# Patient Record
Sex: FEMALE | Race: ASIAN | Employment: PART TIME | URBAN - METROPOLITAN AREA
[De-identification: names, ages, dates, MRNs, and addresses within clinical notes are randomized per-mention and may not be internally consistent; named-entity substitution may affect disease eponyms.]

---

## 2017-10-30 ENCOUNTER — OFFICE VISIT (OUTPATIENT)
Dept: INTERNAL MEDICINE CLINIC | Facility: CLINIC | Age: 42
End: 2017-10-30

## 2017-10-30 VITALS
TEMPERATURE: 99 F | DIASTOLIC BLOOD PRESSURE: 82 MMHG | HEART RATE: 70 BPM | WEIGHT: 135 LBS | HEIGHT: 63.5 IN | SYSTOLIC BLOOD PRESSURE: 121 MMHG | BODY MASS INDEX: 23.62 KG/M2 | RESPIRATION RATE: 18 BRPM

## 2017-10-30 DIAGNOSIS — D64.89 ANEMIA DUE TO OTHER CAUSE, NOT CLASSIFIED: ICD-10-CM

## 2017-10-30 DIAGNOSIS — E55.9 VITAMIN D DEFICIENCY: ICD-10-CM

## 2017-10-30 DIAGNOSIS — Z01.419 ENCOUNTER FOR GYNECOLOGICAL EXAMINATION: Primary | ICD-10-CM

## 2017-10-30 DIAGNOSIS — N63.0 BREAST MASS: ICD-10-CM

## 2017-10-30 DIAGNOSIS — E78.00 PURE HYPERCHOLESTEROLEMIA: ICD-10-CM

## 2017-10-30 DIAGNOSIS — N63.20 LEFT BREAST MASS: ICD-10-CM

## 2017-10-30 PROCEDURE — 99396 PREV VISIT EST AGE 40-64: CPT | Performed by: INTERNAL MEDICINE

## 2017-10-30 PROCEDURE — 99213 OFFICE O/P EST LOW 20 MIN: CPT | Performed by: INTERNAL MEDICINE

## 2017-10-30 NOTE — PROGRESS NOTES
HPI:    Patient ID: Sorin Gomez is a 36year old female.   Patient patient presents today for pap smear / breast  exam, states doing well otherwise, denies vaginal discharge  pain or   Other concerns denies  vomiting, diarrhea, constipation or abdominal carmella Abdominal: Soft. She exhibits no mass. There is no hepatosplenomegaly. There is no tenderness. Genitourinary: Vagina normal and uterus normal. There is no tenderness or lesion on the right labia. There is no tenderness or lesion on the left labia.  Uterus Anemia due to other cause, not classified  Labs       Left breast mass-new breast mass  2:00 PM left breast-nontender patient mother has history of fatty tissue / masses in breasts  Dg  mammogram bilateral breast ordered with ultrasound if needed also carolyn

## 2017-10-31 ENCOUNTER — TELEPHONE (OUTPATIENT)
Dept: INTERNAL MEDICINE CLINIC | Facility: CLINIC | Age: 42
End: 2017-10-31

## 2017-10-31 ENCOUNTER — LAB ENCOUNTER (OUTPATIENT)
Dept: LAB | Age: 42
End: 2017-10-31
Attending: INTERNAL MEDICINE
Payer: COMMERCIAL

## 2017-10-31 DIAGNOSIS — E55.9 VITAMIN D DEFICIENCY: ICD-10-CM

## 2017-10-31 DIAGNOSIS — D64.9 ANEMIA: Primary | ICD-10-CM

## 2017-10-31 DIAGNOSIS — E78.00 PURE HYPERCHOLESTEROLEMIA: ICD-10-CM

## 2017-10-31 DIAGNOSIS — E53.8 B12 DEFICIENCY: ICD-10-CM

## 2017-10-31 DIAGNOSIS — E53.8 B12 DEFICIENCY: Primary | ICD-10-CM

## 2017-10-31 DIAGNOSIS — D64.89 ANEMIA DUE TO OTHER CAUSE, NOT CLASSIFIED: ICD-10-CM

## 2017-10-31 PROCEDURE — 85025 COMPLETE CBC W/AUTO DIFF WBC: CPT

## 2017-10-31 PROCEDURE — 82746 ASSAY OF FOLIC ACID SERUM: CPT

## 2017-10-31 PROCEDURE — 82607 VITAMIN B-12: CPT

## 2017-10-31 PROCEDURE — 36415 COLL VENOUS BLD VENIPUNCTURE: CPT

## 2017-10-31 PROCEDURE — 82728 ASSAY OF FERRITIN: CPT

## 2017-10-31 PROCEDURE — 81015 MICROSCOPIC EXAM OF URINE: CPT

## 2017-10-31 PROCEDURE — 80061 LIPID PANEL: CPT

## 2017-10-31 PROCEDURE — 80053 COMPREHEN METABOLIC PANEL: CPT

## 2017-10-31 PROCEDURE — 84443 ASSAY THYROID STIM HORMONE: CPT

## 2017-10-31 PROCEDURE — 82306 VITAMIN D 25 HYDROXY: CPT

## 2017-10-31 NOTE — TELEPHONE ENCOUNTER
Pt calling and stts she is at the lab in [place] and a Vitamin b-12 lab was supposed to be done as well, yet no order in chart. Please advise.

## 2017-11-01 ENCOUNTER — NURSE NAVIGATOR ENCOUNTER (OUTPATIENT)
Dept: HEMATOLOGY/ONCOLOGY | Facility: HOSPITAL | Age: 42
End: 2017-11-01

## 2017-11-01 ENCOUNTER — HOSPITAL ENCOUNTER (OUTPATIENT)
Dept: MAMMOGRAPHY | Facility: HOSPITAL | Age: 42
Discharge: HOME | End: 2017-11-01
Attending: INTERNAL MEDICINE
Payer: COMMERCIAL

## 2017-11-01 ENCOUNTER — HOSPITAL ENCOUNTER (OUTPATIENT)
Dept: ULTRASOUND IMAGING | Facility: HOSPITAL | Age: 42
Discharge: HOME | End: 2017-11-01
Attending: INTERNAL MEDICINE
Payer: COMMERCIAL

## 2017-11-01 DIAGNOSIS — N63.20 LEFT BREAST MASS: ICD-10-CM

## 2017-11-01 DIAGNOSIS — N63.0 BREAST MASS: ICD-10-CM

## 2017-11-01 PROCEDURE — 77066 DX MAMMO INCL CAD BI: CPT | Performed by: INTERNAL MEDICINE

## 2017-11-01 PROCEDURE — 76642 ULTRASOUND BREAST LIMITED: CPT | Performed by: INTERNAL MEDICINE

## 2017-11-01 NOTE — PROGRESS NOTES
Navigator called to patient to follow-up from breast biopsy recommendation. Message left for patient to call Navigator back.

## 2017-11-02 ENCOUNTER — NURSE NAVIGATOR ENCOUNTER (OUTPATIENT)
Dept: HEMATOLOGY/ONCOLOGY | Facility: HOSPITAL | Age: 42
End: 2017-11-02

## 2017-11-02 NOTE — PROGRESS NOTES
Discussed recommended breast biopsy with patient. Reviewed pertinent patient history, family history of cancer, and patient medications.     - Discussed with patient Radiology’s protocol for being off blood thinning medications (including ibuprofen/advil

## 2017-11-15 ENCOUNTER — OFFICE VISIT (OUTPATIENT)
Dept: SURGERY | Facility: CLINIC | Age: 42
End: 2017-11-15

## 2017-11-15 VITALS
TEMPERATURE: 98 F | BODY MASS INDEX: 24.1 KG/M2 | HEIGHT: 63 IN | HEART RATE: 77 BPM | SYSTOLIC BLOOD PRESSURE: 126 MMHG | WEIGHT: 136 LBS | DIASTOLIC BLOOD PRESSURE: 71 MMHG | RESPIRATION RATE: 20 BRPM

## 2017-11-15 DIAGNOSIS — N63.20 LEFT BREAST MASS: Primary | ICD-10-CM

## 2017-11-15 PROCEDURE — 99244 OFF/OP CNSLTJ NEW/EST MOD 40: CPT | Performed by: SURGERY

## 2017-11-15 NOTE — PROGRESS NOTES
Pt had abnormal mammogram.  She did feel a lump when showering a few months ago, but didn't notice it, it was before her period. Her PCP felt a lump. Denies nipple discharge or breast pain.

## 2017-11-15 NOTE — PROGRESS NOTES
Breast Surgery New Patient Consultation    This is the first visit for this 36year old woman, referred by Dr. Romy Friend, who presents for evaluation of left breast mass.     History of Present Illness:   Ms. Luci Henson is a 36year old woman who present breathing, Phenergan    Family History:   Family History   Problem Relation Age of Onset   • Diabetes Father    • Cancer Mother      Multiple myeloma CA   • Hypertension Mother    • Cancer Maternal Aunt      Cancer-colon       She is not of Fide Duvall vaginal/penile discharge. Skin:    The patient denies rash, itching, skin lesions, +dry skin, change in skin color or change in moles.      Hematologic/Lymphatic:  The patient denies easily bruising or bleeding or persistent swollen glands or lymph nodes normal. There is no skin dimpling with movement of the pectoralis. There is no nipple retraction. No nipple discharge can be elicited. The parenchyma is mildly nodular.  There are no dominant masses in the breast. The axillary tail is normal.  Left breast: order to monitor for interval growth, I would recommend a surveillance exam in 3-4 months within ultrasound of the area in the office that day.   We did discuss the option of a needle biopsy for confirming the pathology diagnosis, however the patient is anx

## 2018-01-31 ENCOUNTER — TELEPHONE (OUTPATIENT)
Dept: INTERNAL MEDICINE CLINIC | Facility: CLINIC | Age: 43
End: 2018-01-31

## 2018-01-31 NOTE — TELEPHONE ENCOUNTER
Pt calling to discuss billing issue. Pt states that she was seen for established-physical exam in October of 2017, but she was also being billed for an office visit. She has spoken with billing and they confirm the coding was correct, but she does not remember ever being charged previously for this. Pt does not believe she should be charged due to it being a well visit. Pt requesting a call back to discuss.

## 2018-02-14 NOTE — TELEPHONE ENCOUNTER
CODING IS  CORRECT  PT  HAD     OTHER    DIAGNOSIS  -  HER MEDICAL  CONDITIONS   - THAT IS NOT   PART JUST  OF  PHYSICAL  / OR PAP ITSELF    -  BREAST   MASS -  THAT IS  NOT PREVENTATIVE  ALSO HYPERLIPIDEMIA  - IS NOT  PREVENTATIVE  . Levi Reveal      PLEASE   EXPLAIN  THIS TO PATIENT      THANKS

## 2018-02-14 NOTE — TELEPHONE ENCOUNTER
Please advise to the communication below. Should this be sent to billing? Thank you. Yoseph Flanagan Please respond to pool: EM IM LMB LPN/CMA

## 2018-04-18 ENCOUNTER — HOSPITAL ENCOUNTER (OUTPATIENT)
Facility: HOSPITAL | Age: 43
Discharge: HOME | End: 2018-04-18
Attending: SURGERY
Payer: COMMERCIAL

## 2018-04-18 ENCOUNTER — OFFICE VISIT (OUTPATIENT)
Dept: SURGERY | Facility: CLINIC | Age: 43
End: 2018-04-18

## 2018-04-18 VITALS
OXYGEN SATURATION: 100 % | SYSTOLIC BLOOD PRESSURE: 114 MMHG | RESPIRATION RATE: 20 BRPM | BODY MASS INDEX: 23.22 KG/M2 | WEIGHT: 136 LBS | HEART RATE: 75 BPM | HEIGHT: 64 IN | DIASTOLIC BLOOD PRESSURE: 72 MMHG

## 2018-04-18 DIAGNOSIS — N63.20 LEFT BREAST MASS: Primary | ICD-10-CM

## 2018-04-18 PROCEDURE — 76642 ULTRASOUND BREAST LIMITED: CPT | Performed by: SURGERY

## 2018-04-18 PROCEDURE — 99211 OFF/OP EST MAY X REQ PHY/QHP: CPT | Performed by: SURGERY

## 2018-04-18 PROCEDURE — 99214 OFFICE O/P EST MOD 30 MIN: CPT | Performed by: SURGERY

## 2018-04-18 RX ORDER — CHLORAL HYDRATE 500 MG
1 CAPSULE ORAL
COMMUNITY

## 2018-04-19 NOTE — PROGRESS NOTES
Breast Surgery Surveillance    History of Present Illness:   Ms. Loyda Cabrera is a 39year old woman who presents with a self detected mass in the left breast and was first seen in November 2017. She has no family history of breast cancer.  She denies any n Cancer Maternal Aunt      Cancer-colon       She is not of Ashkenazi Rastafari ancestry. Social History:    Alcohol use No         Smoking status: Never Smoker   Smokeless tobacco: Never Used   The patient is . She has 3 children.   She is employed denies easily bruising or bleeding or persistent swollen glands or lymph nodes. Musculoskeletal:  The patient denies muscle aches/pain, joint pain, stiff joints, neck pain, back pain or bone pain.     Neuropsychiatric:  There is no history of migraines The axillary tail is normal.  Left breast:   The skin, nipple, and areola appear normal. There is no skin dimpling with movement of the pectoralis. There is no nipple retraction. No nipple discharge can be elicited. The parenchyma is mildly nodular.  There lesion is asymptomatic, observation is a reasonable approach to management at this time. In order to monitor for interval growth, I would recommend a surveillance exam at the time of her annual mammogram in November 2018.   We did discuss the option of a ne

## 2018-10-16 ENCOUNTER — IMMUNIZATION (OUTPATIENT)
Dept: INTERNAL MEDICINE CLINIC | Facility: CLINIC | Age: 43
End: 2018-10-16

## 2018-10-16 DIAGNOSIS — Z23 NEED FOR VACCINATION: ICD-10-CM

## 2018-10-16 PROCEDURE — 90471 IMMUNIZATION ADMIN: CPT | Performed by: INTERNAL MEDICINE

## 2018-10-16 PROCEDURE — 90686 IIV4 VACC NO PRSV 0.5 ML IM: CPT | Performed by: INTERNAL MEDICINE

## 2019-05-10 ENCOUNTER — TELEPHONE (OUTPATIENT)
Dept: INTERNAL MEDICINE CLINIC | Facility: CLINIC | Age: 44
End: 2019-05-10

## 2019-05-10 DIAGNOSIS — D64.89 ANEMIA DUE TO OTHER CAUSE, NOT CLASSIFIED: ICD-10-CM

## 2019-05-10 DIAGNOSIS — Z00.00 ANNUAL PHYSICAL EXAM: Primary | ICD-10-CM

## 2019-05-10 DIAGNOSIS — E55.9 VITAMIN D DEFICIENCY: ICD-10-CM

## 2019-05-13 NOTE — TELEPHONE ENCOUNTER
Pt is requesting an order for labs before upcoming appt. Orders generated and pend for approval. Thank you. Nayana Mills .Please respond to pool: EMILY HANNAH LPN/ELISA

## 2019-05-14 NOTE — TELEPHONE ENCOUNTER
Pt was contacted and informed that the order for the labs are in the system and that she will need to be fasting for 12 hours. Understanding was voiced.

## 2019-09-12 ENCOUNTER — TELEPHONE (OUTPATIENT)
Dept: INTERNAL MEDICINE CLINIC | Facility: CLINIC | Age: 44
End: 2019-09-12

## 2019-09-12 DIAGNOSIS — Z28.3 BEHIND ON IMMUNIZATIONS: Primary | ICD-10-CM

## 2019-09-12 DIAGNOSIS — Z11.1 SCREENING-PULMONARY TB: ICD-10-CM

## 2019-09-12 NOTE — TELEPHONE ENCOUNTER
Pt states that she already has orders in the system that she will be doing, but, pt would like to know if the doctor can also order titers for HEP B , Measle, Mumps Rubella Varicella and quantifor TB gold blood test. Please, call pt when those orders are e

## 2019-09-16 NOTE — TELEPHONE ENCOUNTER
Patient is requesting titers for HEP B , Measle, Mumps Rubella Varicella and quantifor TB gold blood test. Please advise.

## 2019-09-17 ENCOUNTER — OFFICE VISIT (OUTPATIENT)
Dept: OCCUPATIONAL MEDICINE | Age: 44
End: 2019-09-17
Attending: FAMILY MEDICINE
Payer: COMMERCIAL

## 2019-09-17 ENCOUNTER — TELEPHONE (OUTPATIENT)
Dept: INTERNAL MEDICINE CLINIC | Facility: CLINIC | Age: 44
End: 2019-09-17

## 2019-09-17 ENCOUNTER — TELEPHONE (OUTPATIENT)
Dept: FAMILY MEDICINE CLINIC | Facility: CLINIC | Age: 44
End: 2019-09-17

## 2019-09-17 ENCOUNTER — LAB ENCOUNTER (OUTPATIENT)
Dept: LAB | Age: 44
End: 2019-09-17
Attending: INTERNAL MEDICINE
Payer: COMMERCIAL

## 2019-09-17 DIAGNOSIS — E55.9 VITAMIN D DEFICIENCY: ICD-10-CM

## 2019-09-17 DIAGNOSIS — Z00.00 ROUTINE GENERAL MEDICAL EXAMINATION AT A HEALTH CARE FACILITY: Primary | ICD-10-CM

## 2019-09-17 DIAGNOSIS — Z11.1 SCREENING-PULMONARY TB: ICD-10-CM

## 2019-09-17 DIAGNOSIS — Z28.3 BEHIND ON IMMUNIZATIONS: ICD-10-CM

## 2019-09-17 DIAGNOSIS — Z28.39 BEHIND ON IMMUNIZATIONS: Primary | ICD-10-CM

## 2019-09-17 DIAGNOSIS — Z00.00 ANNUAL PHYSICAL EXAM: ICD-10-CM

## 2019-09-17 DIAGNOSIS — D64.89 ANEMIA DUE TO OTHER CAUSE, NOT CLASSIFIED: ICD-10-CM

## 2019-09-17 LAB
ALBUMIN SERPL-MCNC: 4 G/DL (ref 3.4–5)
ALBUMIN/GLOB SERPL: 1.1 {RATIO} (ref 1–2)
ALP LIVER SERPL-CCNC: 58 U/L (ref 37–98)
ALT SERPL-CCNC: 26 U/L (ref 13–56)
ANION GAP SERPL CALC-SCNC: 8 MMOL/L (ref 0–18)
AST SERPL-CCNC: 15 U/L (ref 15–37)
BASOPHILS # BLD AUTO: 0.06 X10(3) UL (ref 0–0.2)
BASOPHILS NFR BLD AUTO: 0.9 %
BILIRUB SERPL-MCNC: 0.6 MG/DL (ref 0.1–2)
BILIRUB UR QL: NEGATIVE
BUN BLD-MCNC: 10 MG/DL (ref 7–18)
BUN/CREAT SERPL: 17.5 (ref 10–20)
CALCIUM BLD-MCNC: 9.2 MG/DL (ref 8.5–10.1)
CHLORIDE SERPL-SCNC: 106 MMOL/L (ref 98–112)
CHOLEST SMN-MCNC: 219 MG/DL (ref ?–200)
CLARITY UR: CLEAR
CO2 SERPL-SCNC: 25 MMOL/L (ref 21–32)
COLOR UR: YELLOW
CREAT BLD-MCNC: 0.57 MG/DL (ref 0.55–1.02)
DEPRECATED HBV CORE AB SER IA-ACNC: 20.1 NG/ML (ref 12–240)
DEPRECATED RDW RBC AUTO: 39.7 FL (ref 35.1–46.3)
EOSINOPHIL # BLD AUTO: 0.28 X10(3) UL (ref 0–0.7)
EOSINOPHIL NFR BLD AUTO: 4.1 %
ERYTHROCYTE [DISTWIDTH] IN BLOOD BY AUTOMATED COUNT: 12.1 % (ref 11–15)
FOLATE SERPL-MCNC: >20 NG/ML (ref 8.7–?)
GLOBULIN PLAS-MCNC: 3.8 G/DL (ref 2.8–4.4)
GLUCOSE BLD-MCNC: 90 MG/DL (ref 70–99)
GLUCOSE UR-MCNC: NEGATIVE MG/DL
HBV SURFACE AB SER QL: REACTIVE
HBV SURFACE AB SERPL IA-ACNC: >1000 MIU/ML
HCT VFR BLD AUTO: 38.3 % (ref 35–48)
HDLC SERPL-MCNC: 38 MG/DL (ref 40–59)
HGB BLD-MCNC: 12.6 G/DL (ref 12–16)
IMM GRANULOCYTES # BLD AUTO: 0.01 X10(3) UL (ref 0–1)
IMM GRANULOCYTES NFR BLD: 0.1 %
KETONES UR-MCNC: NEGATIVE MG/DL
LDLC SERPL CALC-MCNC: 129 MG/DL (ref ?–100)
LEUKOCYTE ESTERASE UR QL STRIP.AUTO: NEGATIVE
LYMPHOCYTES # BLD AUTO: 2.46 X10(3) UL (ref 1–4)
LYMPHOCYTES NFR BLD AUTO: 35.9 %
M PROTEIN MFR SERPL ELPH: 7.8 G/DL (ref 6.4–8.2)
MCH RBC QN AUTO: 29.5 PG (ref 26–34)
MCHC RBC AUTO-ENTMCNC: 32.9 G/DL (ref 31–37)
MCV RBC AUTO: 89.7 FL (ref 80–100)
MONOCYTES # BLD AUTO: 0.37 X10(3) UL (ref 0.1–1)
MONOCYTES NFR BLD AUTO: 5.4 %
NEUTROPHILS # BLD AUTO: 3.67 X10 (3) UL (ref 1.5–7.7)
NEUTROPHILS # BLD AUTO: 3.67 X10(3) UL (ref 1.5–7.7)
NEUTROPHILS NFR BLD AUTO: 53.6 %
NITRITE UR QL STRIP.AUTO: NEGATIVE
NONHDLC SERPL-MCNC: 181 MG/DL (ref ?–130)
OSMOLALITY SERPL CALC.SUM OF ELEC: 287 MOSM/KG (ref 275–295)
PATIENT FASTING: YES
PATIENT FASTING: YES
PH UR: 7 [PH] (ref 5–8)
PLATELET # BLD AUTO: 234 10(3)UL (ref 150–450)
POTASSIUM SERPL-SCNC: 4 MMOL/L (ref 3.5–5.1)
PROT UR-MCNC: NEGATIVE MG/DL
RBC # BLD AUTO: 4.27 X10(6)UL (ref 3.8–5.3)
RBC #/AREA URNS AUTO: <1 /HPF
RUBV IGG SER QL: POSITIVE
RUBV IGG SER-ACNC: 88.4 IU/ML (ref 10–?)
SODIUM SERPL-SCNC: 139 MMOL/L (ref 136–145)
SP GR UR STRIP: 1.01 (ref 1–1.03)
TRIGL SERPL-MCNC: 259 MG/DL (ref 30–149)
TSI SER-ACNC: 0.96 MIU/ML (ref 0.36–3.74)
UROBILINOGEN UR STRIP-ACNC: <2
VIT B12 SERPL-MCNC: 523 PG/ML (ref 193–986)
VLDLC SERPL CALC-MCNC: 52 MG/DL (ref 0–30)
WBC # BLD AUTO: 6.9 X10(3) UL (ref 4–11)
WBC #/AREA URNS AUTO: <1 /HPF

## 2019-09-17 PROCEDURE — 86762 RUBELLA ANTIBODY: CPT

## 2019-09-17 PROCEDURE — 82746 ASSAY OF FOLIC ACID SERUM: CPT

## 2019-09-17 PROCEDURE — 81001 URINALYSIS AUTO W/SCOPE: CPT

## 2019-09-17 PROCEDURE — 86787 VARICELLA-ZOSTER ANTIBODY: CPT

## 2019-09-17 PROCEDURE — 85025 COMPLETE CBC W/AUTO DIFF WBC: CPT

## 2019-09-17 PROCEDURE — 86765 RUBEOLA ANTIBODY: CPT

## 2019-09-17 PROCEDURE — 82306 VITAMIN D 25 HYDROXY: CPT

## 2019-09-17 PROCEDURE — 82728 ASSAY OF FERRITIN: CPT

## 2019-09-17 PROCEDURE — 80061 LIPID PANEL: CPT

## 2019-09-17 PROCEDURE — 86706 HEP B SURFACE ANTIBODY: CPT

## 2019-09-17 PROCEDURE — 84443 ASSAY THYROID STIM HORMONE: CPT

## 2019-09-17 PROCEDURE — 86735 MUMPS ANTIBODY: CPT

## 2019-09-17 PROCEDURE — 86480 TB TEST CELL IMMUN MEASURE: CPT

## 2019-09-17 PROCEDURE — 80053 COMPREHEN METABOLIC PANEL: CPT

## 2019-09-17 PROCEDURE — 82607 VITAMIN B-12: CPT

## 2019-09-17 PROCEDURE — 36415 COLL VENOUS BLD VENIPUNCTURE: CPT

## 2019-09-17 NOTE — TELEPHONE ENCOUNTER
Pt states that she is in the lab in [address] [place] [address] and would like to know if the doctor can order the varicella titer for her. Per pt she already did the rest of the blood work.

## 2019-09-17 NOTE — TELEPHONE ENCOUNTER
Order pend for varicella titer for approval. Thank you       . Anne Bay Please respond to pool: EM IM LMB LPN/CMA

## 2019-09-18 LAB
25(OH)D3 SERPL-MCNC: 14.7 NG/ML (ref 30–100)
M TB IFN-G CD4+ T-CELLS BLD-ACNC: 0.04 IU/ML
M TB TUBERC IFN-G BLD QL: NEGATIVE
M TB TUBERC IGNF/MITOGEN IGNF CONTROL: >10 IU/ML
MEV IGG SER-ACNC: >300 AU/ML (ref 30–?)
MUV IGG SER IA-ACNC: 152 AU/ML (ref 11–?)
QUANTIFERON TB1 MINUS NIL: 0.2 IU/ML
QUANTIFERON TB2 MINUS NIL: 0.1 IU/ML
VZV IGG SER IA-ACNC: 1155 (ref 165–?)

## 2019-09-23 ENCOUNTER — OFFICE VISIT (OUTPATIENT)
Dept: INTERNAL MEDICINE CLINIC | Facility: CLINIC | Age: 44
End: 2019-09-23

## 2019-09-23 VITALS
HEIGHT: 64 IN | WEIGHT: 138 LBS | TEMPERATURE: 98 F | DIASTOLIC BLOOD PRESSURE: 82 MMHG | SYSTOLIC BLOOD PRESSURE: 120 MMHG | HEART RATE: 69 BPM | BODY MASS INDEX: 23.56 KG/M2 | RESPIRATION RATE: 20 BRPM

## 2019-09-23 DIAGNOSIS — E78.00 PURE HYPERCHOLESTEROLEMIA: ICD-10-CM

## 2019-09-23 DIAGNOSIS — Z23 NEED FOR INFLUENZA VACCINATION: ICD-10-CM

## 2019-09-23 DIAGNOSIS — Z00.00 PE (PHYSICAL EXAM), ROUTINE: Primary | ICD-10-CM

## 2019-09-23 DIAGNOSIS — R79.0 LOW FERRITIN: ICD-10-CM

## 2019-09-23 DIAGNOSIS — E55.9 VITAMIN D DEFICIENCY: ICD-10-CM

## 2019-09-23 DIAGNOSIS — R79.0 DECREASED FERRITIN: ICD-10-CM

## 2019-09-23 PROCEDURE — 90686 IIV4 VACC NO PRSV 0.5 ML IM: CPT | Performed by: INTERNAL MEDICINE

## 2019-09-23 PROCEDURE — 90471 IMMUNIZATION ADMIN: CPT | Performed by: INTERNAL MEDICINE

## 2019-09-23 PROCEDURE — 99396 PREV VISIT EST AGE 40-64: CPT | Performed by: INTERNAL MEDICINE

## 2019-09-23 RX ORDER — ACETAMINOPHEN 160 MG
2000 TABLET,DISINTEGRATING ORAL DAILY
Qty: 90 CAPSULE | Refills: 3 | Status: SHIPPED | OUTPATIENT
Start: 2019-09-23

## 2019-09-23 NOTE — PROGRESS NOTES
HPI:    Patient ID: Sebastien Samson is a 38year old female. No chief complaint on file.     Patient presents today for physical  Exam states doing well otherwise, denies chest pain, shortness of breath, dyspnea on exertion or heart palpitations also denies na well-nourished. No distress. HENT:   Head: Normocephalic and atraumatic.    Right Ear: Tympanic membrane, external ear and ear canal normal.   Left Ear: Tympanic membrane, external ear and ear canal normal.   Nose: Nose normal. Right sinus exhibits no max reviewed mammogram in the system patient will complete that as well.   Immunizations reviewed -provided patient with immunization records and updated today with a flu shot  Patient verbalized understanding and compliance     Need for influenza vaccination

## 2020-02-12 ENCOUNTER — TELEPHONE (OUTPATIENT)
Dept: OTHER | Age: 45
End: 2020-02-12

## 2020-02-12 DIAGNOSIS — H02.89 PAIN AND SWELLING OF EYELID OF LEFT EYE: Primary | ICD-10-CM

## 2020-02-12 DIAGNOSIS — H02.846 PAIN AND SWELLING OF EYELID OF LEFT EYE: Primary | ICD-10-CM

## 2020-02-12 NOTE — TELEPHONE ENCOUNTER
Patient states has sty of left eye since 2/8/20 Left upper lid. Swollen, discomfort, tearing, has headache left side and swollen lymph node left neck. No fever. Has been using warm compresses to eyelid and not improving. Offered appointment today.   Woul

## 2020-02-12 NOTE — TELEPHONE ENCOUNTER
Per QUITA left message on voice mail referral placed phone number for Dr. Trudy Long given to patient.

## 2020-02-13 ENCOUNTER — OFFICE VISIT (OUTPATIENT)
Dept: OPHTHALMOLOGY | Facility: CLINIC | Age: 45
End: 2020-02-13

## 2020-02-13 DIAGNOSIS — B02.33 HZV (HERPES ZOSTER VIRUS) KERATOCONJUNCTIVITIS: Primary | ICD-10-CM

## 2020-02-13 PROBLEM — H57.89 EYE IRRITATION: Status: ACTIVE | Noted: 2020-02-13

## 2020-02-13 PROCEDURE — 99243 OFF/OP CNSLTJ NEW/EST LOW 30: CPT | Performed by: OPHTHALMOLOGY

## 2020-02-13 RX ORDER — VALACYCLOVIR HYDROCHLORIDE 1 G/1
1 TABLET, FILM COATED ORAL 3 TIMES DAILY
Qty: 21 TABLET | Refills: 0 | Status: SHIPPED | OUTPATIENT
Start: 2020-02-13 | End: 2020-02-20

## 2020-02-13 RX ORDER — PREDNISOLONE ACETATE 10 MG/ML
1 SUSPENSION/ DROPS OPHTHALMIC 4 TIMES DAILY
Qty: 1 BOTTLE | Refills: 0 | Status: SHIPPED | OUTPATIENT
Start: 2020-02-13 | End: 2020-02-20

## 2020-02-13 NOTE — PROGRESS NOTES
Magi Chan is a 38year old female. HPI:     HPI     Consult      Additional comments: Per Dr Sonali Espinoza               Comments     Patient is here for evaluation of swelling, irritation and tearing OS since 2/8/2020.  Patient states had a small bump on OSCAR 2 Multiple Vitamin (MULTI-VITAMINS) Oral Tab Take by mouth daily.            Allergies:    Promethazine                Comment:Other reaction(s): problems breathing, Phenergan    ROS:     ROS     Positive for: Eyes    Negative for: Constitutional, Gastrointes prednisoLONE acetate (PRED FORTE) 1 % Ophthalmic Suspension 1 Bottle 0     Sig: Place 1 drop into the left eye 4 (four) times daily.    • valACYclovir HCl 1 G Oral Tab 21 tablet 0     Sig: Take 1 tablet (1,000 mg total) by mouth 3 (three) times daily for 7

## 2020-02-13 NOTE — PATIENT INSTRUCTIONS
HZV (herpes zoster virus) keratoconjunctivitis  Discussed that this looks like a herpes zoster infection. Start Pred forte drops; use 1 drop 4 times a day in the left eye until next visit next week.      s.Start Valtrex (1 gram) 3 times a day for 7 days · After a few days, the blisters become dry and form a crust. The crust falls off in days to weeks. The blisters generally do not leave scars. How is shingles treated? For most people, shingles heals on its own in a few weeks.  But treatment is recommende You can only get shingles if you have had chickenpox in the past. Those who have never had chickenpox can get the virus from you. Although instead of developing shingles, the person may get chickenpox.  Until your blisters form scabs, avoid contact with oth After the blisters heal, the affected skin may be sensitive or painful for weeks or months, gradually resolving over time. But, sometimes this can last longer and be permanent (called postherpetic neuralgia.)  Shingles vaccines are available.  Vaccination c © 1222-7546 [employer] [address] [facility] [facility] [place] [place]. All rights reserved. This information is not intended as a substitute for professional medical care. Always follow your healthcare professional's instructions.

## 2020-02-13 NOTE — ASSESSMENT & PLAN NOTE
Discussed that this looks like a herpes zoster infection. Start Pred forte drops; use 1 drop 4 times a day in the left eye until next visit next week. s.Start Valtrex (1 gram) 3 times a day for 7 days, then discontinue.       Patient should call offic

## 2020-02-14 ENCOUNTER — TELEPHONE (OUTPATIENT)
Dept: OPHTHALMOLOGY | Facility: CLINIC | Age: 45
End: 2020-02-14

## 2020-02-14 NOTE — TELEPHONE ENCOUNTER
Pt will go ahead and use the drops but she is very concerned about a \"contraindication\" listed on the medication.  She will keep her apt as scheduled on 2/19/20

## 2020-02-14 NOTE — TELEPHONE ENCOUNTER
Routed to [facility]. Informed patient he will not be back in the office until Tuesday.

## 2020-02-14 NOTE — TELEPHONE ENCOUNTER
Pt calling back states medication given should not be use with her eye condition per the direction on medication .  Pt states not comfortable using the medication and wants to know can something else be prescribed please advise       prednisoLONE acetate (P

## 2020-02-17 NOTE — TELEPHONE ENCOUNTER
I followed up with pt this morning and she states that the swelling got worse on her face. She is using the Pred Forte drops 4x a day as directed. She was seen at urgent care on [facility]; Dr. Amber Rodriguez was on call and saw the patient.  He Rxd pt oral Prednisone

## 2020-02-19 ENCOUNTER — TELEPHONE (OUTPATIENT)
Dept: OPHTHALMOLOGY | Facility: CLINIC | Age: 45
End: 2020-02-19

## 2020-02-19 ENCOUNTER — MED REC SCAN ONLY (OUTPATIENT)
Dept: INTERNAL MEDICINE CLINIC | Facility: CLINIC | Age: 45
End: 2020-02-19

## 2020-02-19 DIAGNOSIS — B02.33 HZV (HERPES ZOSTER VIRUS) KERATOCONJUNCTIVITIS: Primary | ICD-10-CM

## 2020-02-19 NOTE — TELEPHONE ENCOUNTER
Pt has scheduled f/u visit with Dr. Martin Cullen due to infection of eye. Referral generated and pend for approval. Pt has appt on 02/20/2020. Thank you. Justine Zhang .Please respond to pool: EM IM LMB LPN/CMA

## 2020-02-20 ENCOUNTER — OFFICE VISIT (OUTPATIENT)
Dept: OPHTHALMOLOGY | Facility: CLINIC | Age: 45
End: 2020-02-20

## 2020-02-20 DIAGNOSIS — B02.32 HZV (HERPES ZOSTER VIRUS) IRITIS: Primary | ICD-10-CM

## 2020-02-20 PROCEDURE — 99213 OFFICE O/P EST LOW 20 MIN: CPT | Performed by: OPHTHALMOLOGY

## 2020-02-20 RX ORDER — PREDNISOLONE ACETATE 10 MG/ML
1 SUSPENSION/ DROPS OPHTHALMIC 4 TIMES DAILY
Qty: 1 BOTTLE | Refills: 0 | Status: SHIPPED | OUTPATIENT
Start: 2020-02-20 | End: 2020-02-27

## 2020-02-20 NOTE — ASSESSMENT & PLAN NOTE
Continue Pred forte 4 times a day in the left eye until next visit in 2 weeks. Patient should call office and make sooner return appointment if symptoms worsen.

## 2020-02-20 NOTE — PATIENT INSTRUCTIONS
HZV (herpes zoster virus) iritis  Continue Pred forte 4 times a day in the left eye until next visit in 2 weeks. Patient should call office and make sooner return appointment if symptoms worsen.

## 2020-02-20 NOTE — TELEPHONE ENCOUNTER
Managed care please notify patient once chart has been updated with approval, thank you.     Referral # Creation Date Referral Status Status Update    [id number] 02/19/2020 Open 02/19/2020: Status History   Status Reason Referral Type Referral Reasons Referra

## 2020-02-25 ENCOUNTER — TELEPHONE (OUTPATIENT)
Dept: OPHTHALMOLOGY | Facility: CLINIC | Age: 45
End: 2020-02-25

## 2020-02-25 NOTE — TELEPHONE ENCOUNTER
Moved apt up sooner to this Thursday and told pt to continue taking the drops 4x a day as directed at her last apt.

## 2020-02-27 ENCOUNTER — OFFICE VISIT (OUTPATIENT)
Dept: OPHTHALMOLOGY | Facility: CLINIC | Age: 45
End: 2020-02-27

## 2020-02-27 DIAGNOSIS — B02.32 HZV (HERPES ZOSTER VIRUS) IRITIS: Primary | ICD-10-CM

## 2020-02-27 PROCEDURE — 99213 OFFICE O/P EST LOW 20 MIN: CPT | Performed by: OPHTHALMOLOGY

## 2020-02-27 RX ORDER — PREDNISOLONE ACETATE 10 MG/ML
SUSPENSION/ DROPS OPHTHALMIC
Qty: 1 BOTTLE | Refills: 0 | Status: SHIPPED | OUTPATIENT
Start: 2020-02-27 | End: 2020-03-19

## 2020-02-27 NOTE — PROGRESS NOTES
Hattie Booker is a 38year old female. HPI:     HPI     Patient is here for a recheck of herpes zoster iritis OS. She is taking Pred Forte OS QID.   She complains of occasional  Pain (4/10)  behind her left eye when she moves it, itching in the temporal c ([facility], 10:44 AM)       Right Left    Pressure  15          Pupils       Pupils    Right PERRL    Left PERRL            Slit Lamp and Fundus Exam     External Exam       Right Left    External  Scarring on left forehead, no nodes          Slit Lamp Exam

## 2020-02-27 NOTE — ASSESSMENT & PLAN NOTE
Improved. Begin to wean off of Pred forte in the left eye. Use 1 drop 3 times a day for 7 day, then 2 times a day for 7 days, then once a day for 7 days, then discontinue.   Patient should call office and make a sooner return appointment if symptoms worse

## 2020-03-18 ENCOUNTER — TELEPHONE (OUTPATIENT)
Dept: OPHTHALMOLOGY | Facility: CLINIC | Age: 45
End: 2020-03-18

## 2020-03-18 NOTE — TELEPHONE ENCOUNTER
Called patient to make sure that she was able to get in at [facility] today.  Pt states she she was seen and was told she has episcleritis OS, was told to increase her Pred Forte drops OD TID for a week and that they would send our office notes fr

## 2020-03-18 NOTE — TELEPHONE ENCOUNTER
Spoke with patient and she said yesterday she started to notice some redness on upper part of white part of eye OS, today she feels like the redness is starting to come to the inner corner of white of the eye. Pt feels some tenderness OS.  She is still taki

## 2020-04-02 ENCOUNTER — OFFICE VISIT (OUTPATIENT)
Dept: OPHTHALMOLOGY | Facility: CLINIC | Age: 45
End: 2020-04-02

## 2020-04-02 DIAGNOSIS — H15.102 EPISCLERITIS OF LEFT EYE: ICD-10-CM

## 2020-04-02 DIAGNOSIS — B02.32 HZV (HERPES ZOSTER VIRUS) IRITIS: Primary | ICD-10-CM

## 2020-04-02 PROCEDURE — 99213 OFFICE O/P EST LOW 20 MIN: CPT | Performed by: OPHTHALMOLOGY

## 2020-04-02 RX ORDER — PREDNISOLONE ACETATE 10 MG/ML
1 SUSPENSION/ DROPS OPHTHALMIC
COMMUNITY
Start: 2020-03-20 | End: 2020-05-07 | Stop reason: ALTCHOICE

## 2020-04-02 NOTE — PATIENT INSTRUCTIONS
HZV (herpes zoster virus) iritis  Advised patient to start tapering Pred Forte left eye. Pred Forte left eye 2 times a day for 2 weeks, then once a day for 2 weeks.       Episcleritis of left eye  Taper Pred Forte left eye

## 2020-04-02 NOTE — PROGRESS NOTES
Magi hCan is a 54year old female. HPI:     HPI     Pt is here for a recheck of HZV iritis OS and nodular episcleritis. Pt is taking Pred Forte OS TID.  Pt was last seen by Dr. Radha Vargas on 3/18/2020, was given Piroxicam 30 mg PO QD (pt not taking, told he PERRL    Left PERRL            Slit Lamp and Fundus Exam     External Exam       Right Left    External  Scarring on left forehead, no nodes          Slit Lamp Exam       Right Left    Lids/Lashes Dermatochalasis, Meibomian gland dysfunction Dermatochalasi

## 2020-04-02 NOTE — ASSESSMENT & PLAN NOTE
Advised patient to start tapering Pred Forte left eye. Pred Forte left eye 2 times a day for 2 weeks, then once a day for 2 weeks.

## 2020-04-06 ENCOUNTER — TELEPHONE (OUTPATIENT)
Dept: OPHTHALMOLOGY | Facility: CLINIC | Age: 45
End: 2020-04-06

## 2020-04-06 NOTE — TELEPHONE ENCOUNTER
Pt called stating pt is using prednisolone eye drops for 4 weeks. 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 could pt get a letter.   Please call pt

## 2020-04-07 NOTE — TELEPHONE ENCOUNTER
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 Pt is nervous that when on Prednisolone she is at risk of infecting her eyes and thinks she will be at risk with patients in isolation. Pt would like a letter stating that while she is still on Prednisolone drop that she should not be seeing any patients in isolation. Told patient I would call her back with and problems getting letter and will check with Dr regarding having letter sent.

## 2020-04-07 NOTE — TELEPHONE ENCOUNTER
Spoke with patient and let her know per ARACELIS she does not need a note for work she is not immunocompromised using Pred Forte BID for 1 week and then once a day for 2 weeks. She should keep her follow up appt in 3 weeks.

## 2020-05-01 ENCOUNTER — TELEPHONE (OUTPATIENT)
Dept: OPHTHALMOLOGY | Facility: CLINIC | Age: 45
End: 2020-05-01

## 2020-05-01 NOTE — TELEPHONE ENCOUNTER
Pt returned the call. Pt received a message to rescheduled appointment to 57-20 at 9:00am.  Pt accepted new appointment.

## 2020-05-07 ENCOUNTER — OFFICE VISIT (OUTPATIENT)
Dept: OPHTHALMOLOGY | Facility: CLINIC | Age: 45
End: 2020-05-07

## 2020-05-07 DIAGNOSIS — B02.32 HZV (HERPES ZOSTER VIRUS) IRITIS: ICD-10-CM

## 2020-05-07 DIAGNOSIS — H15.102 EPISCLERITIS OF LEFT EYE: Primary | ICD-10-CM

## 2020-05-07 PROCEDURE — 99213 OFFICE O/P EST LOW 20 MIN: CPT | Performed by: OPHTHALMOLOGY

## 2020-05-07 NOTE — PROGRESS NOTES
Amanda Burns is a 54year old female. HPI:     HPI     Pt is here for recheck episcleritis OS. Pt has been off Pred Forte drops for 1 week, last used 4/23/2020.  Pt states OS is much better, she does still see some pink on upper inner corner of white part Right Left    External  Scarring on left forehead, no nodes          Slit Lamp Exam       Right Left    Lids/Lashes Dermatochalasis, Meibomian gland dysfunction Dermatochalasis, Meibomian gland dysfunction, healing vescile LLL by nose    Conjunctiva/Sclera

## 2020-05-07 NOTE — PATIENT INSTRUCTIONS
HZV (herpes zoster virus) iritis  Stop Pred Forte, will continue to watch for redness, blurred vision or pain. Patient will let us know if symptoms start. Episcleritis of left eye  Same as above.

## 2020-05-07 NOTE — ASSESSMENT & PLAN NOTE
Stop Pred Forte, will continue to watch for redness, blurred vision or pain. Patient will let us know if symptoms start.

## 2020-06-09 ENCOUNTER — OFFICE VISIT (OUTPATIENT)
Dept: OPHTHALMOLOGY | Facility: CLINIC | Age: 45
End: 2020-06-09

## 2020-06-09 DIAGNOSIS — H17.9 CORNEAL SCAR, LEFT EYE: Primary | ICD-10-CM

## 2020-06-09 PROCEDURE — 99213 OFFICE O/P EST LOW 20 MIN: CPT | Performed by: OPHTHALMOLOGY

## 2020-06-09 NOTE — PATIENT INSTRUCTIONS
Corneal scar, left eye  Discussed diagnosis with patient. She does not want to use steroid drops. Will probably resolve without drops. Will have patient back in 2-3 months to recheck.

## 2020-06-09 NOTE — ASSESSMENT & PLAN NOTE
Discussed diagnosis with patient. She does not want to use steroid drops. Will probably resolve without drops. Will have patient back in 2-3 months to recheck.

## 2020-06-09 NOTE — PROGRESS NOTES
Harsh Garcia is a 54year old female. HPI:     HPI     Pt is here for a recheck iritis OS. Pt complains of swelling on OSCAR that started two days ago. OS was doing much better until swelling on OSCAR two days ago, thinks it may be a stye.  Pt complains of st Pupils       Pupils    Right PERRL    Left PERRL            Slit Lamp and Fundus Exam     External Exam       Right Left    External  Scarring on left forehead, no nodes          Slit Lamp Exam       Right Left    Lids/Lashes Dermatochalasis, Meibomi

## 2020-09-08 ENCOUNTER — OFFICE VISIT (OUTPATIENT)
Dept: OPHTHALMOLOGY | Facility: CLINIC | Age: 45
End: 2020-09-08

## 2020-09-08 DIAGNOSIS — H17.9 CORNEAL SCAR, LEFT EYE: Primary | ICD-10-CM

## 2020-09-08 PROBLEM — B02.32: Status: RESOLVED | Noted: 2020-02-13 | Resolved: 2020-09-08

## 2020-09-08 PROCEDURE — 99213 OFFICE O/P EST LOW 20 MIN: CPT | Performed by: OPHTHALMOLOGY

## 2020-09-08 NOTE — PATIENT INSTRUCTIONS
Corneal scar, left eye  Improved, but not completed resolved. Discussed that this will most likely continue to improve without steroids. Told patient to watch for symptoms of pain, redness, light sensitivity and blurry vision.   Discussed if these sympto

## 2020-09-08 NOTE — PROGRESS NOTES
Bren Desai is a 54year old female.     HPI:     HPI     Patient is here for a 3 months recheck of corneal opacities OS 2nd to HZK OS. (history of HZK OS started on 2/13/20)   Patient was last here on 6/9/20 and Pred Forte was given as an option for the co Dist sc 20/20 20/20          Tonometry ([facility], 10:38 AM)       Right Left    Pressure 14 9          Pupils    Very miotic, round and reactive OU             Slit Lamp and Fundus Exam     Slit Lamp Exam       Right Left    Lids/Lashes Dermatochalasis, Meibo

## 2020-09-08 NOTE — ASSESSMENT & PLAN NOTE
Improved, but not completed resolved. Discussed that this will most likely continue to improve without steroids. Told patient to watch for symptoms of pain, redness, light sensitivity and blurry vision.   Discussed if these symptoms are present, patient

## 2020-10-13 ENCOUNTER — IMMUNIZATION (OUTPATIENT)
Dept: INTERNAL MEDICINE CLINIC | Facility: CLINIC | Age: 45
End: 2020-10-13

## 2020-10-13 DIAGNOSIS — Z23 NEED FOR VACCINATION: ICD-10-CM

## 2020-10-13 PROCEDURE — 90471 IMMUNIZATION ADMIN: CPT | Performed by: INTERNAL MEDICINE

## 2020-10-13 PROCEDURE — 90686 IIV4 VACC NO PRSV 0.5 ML IM: CPT | Performed by: INTERNAL MEDICINE

## 2020-10-14 ENCOUNTER — TELEPHONE (OUTPATIENT)
Dept: INTERNAL MEDICINE CLINIC | Facility: CLINIC | Age: 45
End: 2020-10-14

## 2020-10-14 NOTE — TELEPHONE ENCOUNTER
Patient indicated that had a nurse visit yesterday in [address] [place] [address] and had her flu shot. Needs system updated that had flu shot so she can provide it to her employer. Please advise.

## 2020-10-15 NOTE — TELEPHONE ENCOUNTER
Injection for a flu shot was in pt's chart from 10/13/20. Copied immunization record and placed it in a letter format.   [address] [place] [address] Dr letter

## 2020-12-18 ENCOUNTER — TELEPHONE (OUTPATIENT)
Dept: INTERNAL MEDICINE CLINIC | Facility: CLINIC | Age: 45
End: 2020-12-18

## 2020-12-18 NOTE — TELEPHONE ENCOUNTER
Patient is requesting a copy of her immunization record and lab work from 2019 showing her immunity to mumps, rubella and hep b. Patient is requesting documents be sent to the address on her chart.

## 2020-12-28 NOTE — TELEPHONE ENCOUNTER
Per original message below. Only tests listed were mumps, rubella and hep b. Printed the results for measles, quantiferon, and varicella.   Mailed to pts home address

## 2020-12-28 NOTE — TELEPHONE ENCOUNTER
Per patient she received the copies of her immunization but it is not complete. Patient says that she also needs copies of the TB quantiferon; Measles, Varicella anti-body and all available vaccinations/immunation that she did up to date.  Please send it t

## 2021-04-21 ENCOUNTER — IMMUNIZATION (OUTPATIENT)
Dept: LAB | Facility: HOSPITAL | Age: 46
End: 2021-04-21
Attending: EMERGENCY MEDICINE
Payer: COMMERCIAL

## 2021-04-21 DIAGNOSIS — Z23 NEED FOR VACCINATION: Primary | ICD-10-CM

## 2021-04-21 PROCEDURE — 0011A SARSCOV2 VAC 100MCG/0.5ML IM: CPT

## 2021-05-16 NOTE — TELEPHONE ENCOUNTER
Pt calling and states she need a copy of her immunization records       Please advise states she will pick them up at the [address] when ready call her
Pt was contacted and informed that we do not go to [facility] and that we can not print it to [facility]. Pt was informed that the form can be printed at [facility] [facility] [place] or [facility] and she can pick it up from one of these location. Pt then asked if form can be mailed.  Form printed and
negative...

## 2021-05-25 ENCOUNTER — IMMUNIZATION (OUTPATIENT)
Dept: LAB | Facility: HOSPITAL | Age: 46
End: 2021-05-25
Attending: EMERGENCY MEDICINE
Payer: COMMERCIAL

## 2021-05-25 DIAGNOSIS — Z23 NEED FOR VACCINATION: Primary | ICD-10-CM

## 2021-05-25 PROCEDURE — 0012A SARSCOV2 VAC 100MCG/0.5ML IM: CPT

## 2021-09-07 ENCOUNTER — TELEPHONE (OUTPATIENT)
Dept: OPHTHALMOLOGY | Facility: CLINIC | Age: 46
End: 2021-09-07

## 2021-09-07 NOTE — TELEPHONE ENCOUNTER
Spoke with patient and she was driving to work, so she declines to RS at this time, but will call back to RS.

## 2021-10-22 ENCOUNTER — LAB ENCOUNTER (OUTPATIENT)
Dept: LAB | Age: 46
End: 2021-10-22
Attending: INTERNAL MEDICINE
Payer: COMMERCIAL

## 2021-10-22 DIAGNOSIS — Z00.00 PE (PHYSICAL EXAM), ROUTINE: ICD-10-CM

## 2021-10-22 DIAGNOSIS — E55.9 VITAMIN D DEFICIENCY: ICD-10-CM

## 2021-10-22 DIAGNOSIS — E53.8 VITAMIN B12 DEFICIENCY: ICD-10-CM

## 2021-10-22 DIAGNOSIS — E78.00 PURE HYPERCHOLESTEROLEMIA: ICD-10-CM

## 2021-10-22 PROCEDURE — 80061 LIPID PANEL: CPT

## 2021-10-22 PROCEDURE — 84443 ASSAY THYROID STIM HORMONE: CPT

## 2021-10-22 PROCEDURE — 85025 COMPLETE CBC W/AUTO DIFF WBC: CPT

## 2021-10-22 PROCEDURE — 81001 URINALYSIS AUTO W/SCOPE: CPT

## 2021-10-22 PROCEDURE — 36415 COLL VENOUS BLD VENIPUNCTURE: CPT

## 2021-10-22 PROCEDURE — 82306 VITAMIN D 25 HYDROXY: CPT

## 2021-10-22 PROCEDURE — 80053 COMPREHEN METABOLIC PANEL: CPT

## 2021-10-22 PROCEDURE — 82607 VITAMIN B-12: CPT

## 2021-11-04 ENCOUNTER — OFFICE VISIT (OUTPATIENT)
Dept: INTERNAL MEDICINE CLINIC | Facility: CLINIC | Age: 46
End: 2021-11-04

## 2021-11-04 ENCOUNTER — MED REC SCAN ONLY (OUTPATIENT)
Dept: INTERNAL MEDICINE CLINIC | Facility: CLINIC | Age: 46
End: 2021-11-04

## 2021-11-04 VITALS
HEIGHT: 64 IN | DIASTOLIC BLOOD PRESSURE: 83 MMHG | WEIGHT: 138 LBS | HEART RATE: 66 BPM | SYSTOLIC BLOOD PRESSURE: 124 MMHG | BODY MASS INDEX: 23.56 KG/M2

## 2021-11-04 DIAGNOSIS — Z00.00 PE (PHYSICAL EXAM), ROUTINE: Primary | ICD-10-CM

## 2021-11-04 DIAGNOSIS — E78.00 PURE HYPERCHOLESTEROLEMIA: ICD-10-CM

## 2021-11-04 DIAGNOSIS — Z23 NEED FOR INFLUENZA VACCINATION: ICD-10-CM

## 2021-11-04 PROCEDURE — 90471 IMMUNIZATION ADMIN: CPT | Performed by: INTERNAL MEDICINE

## 2021-11-04 PROCEDURE — 90686 IIV4 VACC NO PRSV 0.5 ML IM: CPT | Performed by: INTERNAL MEDICINE

## 2021-11-04 PROCEDURE — 3008F BODY MASS INDEX DOCD: CPT | Performed by: INTERNAL MEDICINE

## 2021-11-04 PROCEDURE — 3074F SYST BP LT 130 MM HG: CPT | Performed by: INTERNAL MEDICINE

## 2021-11-04 PROCEDURE — 3079F DIAST BP 80-89 MM HG: CPT | Performed by: INTERNAL MEDICINE

## 2021-11-04 PROCEDURE — 99396 PREV VISIT EST AGE 40-64: CPT | Performed by: INTERNAL MEDICINE

## 2021-11-04 NOTE — PROGRESS NOTES
HPI:    Patient ID: Reyes Safe is a 51year old female.   Patient presents with:  Physical    Patient presents today for physical  Exam states doing well otherwise, denies chest pain, shortness of breath, dyspnea on exertion or heart palpitations also scotty Exam  Vitals and nursing note reviewed. Constitutional:       General: She is not in acute distress. Appearance: She is well-developed. HENT:      Head: Normocephalic and atraumatic.       Right Ear: Tympanic membrane, ear canal and external ear nor well.  Immunizations -  patient needs a flu shot and COVID-19 booster-provided patient with immunization titer records and updated   today - flu shot  Patient verbalized understanding and compliance     Need for influenza vaccination  Today    Vitamin d de

## 2022-11-08 ENCOUNTER — IMMUNIZATION (OUTPATIENT)
Dept: INTERNAL MEDICINE CLINIC | Facility: CLINIC | Age: 47
End: 2022-11-08
Payer: COMMERCIAL

## 2022-11-08 DIAGNOSIS — Z23 NEED FOR VACCINATION: Primary | ICD-10-CM

## 2022-11-08 PROCEDURE — 90686 IIV4 VACC NO PRSV 0.5 ML IM: CPT | Performed by: INTERNAL MEDICINE

## 2022-11-08 PROCEDURE — 90471 IMMUNIZATION ADMIN: CPT | Performed by: INTERNAL MEDICINE

## 2022-11-30 ENCOUNTER — TELEPHONE (OUTPATIENT)
Dept: INTERNAL MEDICINE CLINIC | Facility: CLINIC | Age: 47
End: 2022-11-30

## 2022-11-30 NOTE — TELEPHONE ENCOUNTER
Patient is requesting a callback to discuss what is the name and brand flu vaccine was given and states it is ok to leave a voicemail on private cell phone.

## 2023-04-21 ENCOUNTER — TELEPHONE (OUTPATIENT)
Dept: INTERNAL MEDICINE CLINIC | Facility: CLINIC | Age: 48
End: 2023-04-21

## 2023-10-24 ENCOUNTER — NURSE ONLY (OUTPATIENT)
Dept: INTERNAL MEDICINE CLINIC | Facility: CLINIC | Age: 48
End: 2023-10-24

## 2023-10-24 DIAGNOSIS — Z23 NEED FOR VACCINATION: Primary | ICD-10-CM

## 2023-10-24 PROCEDURE — 90686 IIV4 VACC NO PRSV 0.5 ML IM: CPT | Performed by: INTERNAL MEDICINE

## 2023-10-24 PROCEDURE — 90471 IMMUNIZATION ADMIN: CPT | Performed by: INTERNAL MEDICINE

## 2024-07-02 ENCOUNTER — PATIENT MESSAGE (OUTPATIENT)
Dept: INTERNAL MEDICINE CLINIC | Facility: CLINIC | Age: 49
End: 2024-07-02

## 2024-07-02 DIAGNOSIS — E55.9 VITAMIN D DEFICIENCY: Primary | ICD-10-CM

## 2024-07-02 DIAGNOSIS — E53.8 VITAMIN B12 DEFICIENCY: ICD-10-CM

## 2024-07-02 DIAGNOSIS — Z00.00 PE (PHYSICAL EXAM), ROUTINE: ICD-10-CM

## 2024-07-03 NOTE — TELEPHONE ENCOUNTER
From: Nabila Ovalle  To: Mickey Dozier  Sent: 7/2/2024 11:25 AM CDT  Subject: Lab Requests    Good morning Dr. Dozier,  I planning to schedule a physical as soon as possible.  Can you please order some lab request like CBC, CMP, lipid panel, VIT D, VIT b12 before the visit?    Thank you,  Nabila Ovalle

## 2024-11-21 ENCOUNTER — OFFICE VISIT (OUTPATIENT)
Dept: INTERNAL MEDICINE CLINIC | Facility: CLINIC | Age: 49
End: 2024-11-21
Payer: COMMERCIAL

## 2024-11-21 ENCOUNTER — MED REC SCAN ONLY (OUTPATIENT)
Dept: INTERNAL MEDICINE CLINIC | Facility: CLINIC | Age: 49
End: 2024-11-21

## 2024-11-21 VITALS
BODY MASS INDEX: 24 KG/M2 | WEIGHT: 137 LBS | SYSTOLIC BLOOD PRESSURE: 117 MMHG | HEART RATE: 78 BPM | DIASTOLIC BLOOD PRESSURE: 78 MMHG

## 2024-11-21 DIAGNOSIS — D24.2 BREAST FIBROADENOMA IN FEMALE, LEFT: ICD-10-CM

## 2024-11-21 DIAGNOSIS — Z12.31 SCREENING MAMMOGRAM, ENCOUNTER FOR: ICD-10-CM

## 2024-11-21 DIAGNOSIS — Z12.11 SCREEN FOR COLON CANCER: ICD-10-CM

## 2024-11-21 DIAGNOSIS — E04.9 GOITER: ICD-10-CM

## 2024-11-21 DIAGNOSIS — Z01.419 ENCOUNTER FOR GYNECOLOGICAL EXAMINATION: ICD-10-CM

## 2024-11-21 DIAGNOSIS — Z00.00 PE (PHYSICAL EXAM), ROUTINE: Primary | ICD-10-CM

## 2024-11-21 DIAGNOSIS — E78.00 PURE HYPERCHOLESTEROLEMIA: ICD-10-CM

## 2024-11-21 DIAGNOSIS — Z23 NEED FOR INFLUENZA VACCINATION: ICD-10-CM

## 2024-11-21 PROCEDURE — 87624 HPV HI-RISK TYP POOLED RSLT: CPT | Performed by: INTERNAL MEDICINE

## 2024-11-21 PROCEDURE — 88175 CYTOPATH C/V AUTO FLUID REDO: CPT | Performed by: INTERNAL MEDICINE

## 2024-11-21 NOTE — PROGRESS NOTES
HPI:    Patient ID: Nabila Ovalle is a 49 year old female.  Chief Complaint   Patient presents with    Physical    Pap     Patient presents today for physical  Exam , pap smear , hyperlipidemia - on diet state  lost  weight  with  low fat diet and activitys   states doing well otherwise, denies chest pain, shortness of breath, dyspnea on exertion or heart palpitations also denies nausea, vomiting, diarrhea, constipation or abdominal pain. No fever, chills, or UTI symptoms.   The rest of the review of systems, see below.   Hyperlipidemia  This is a chronic problem. The current episode started more than 1 year ago. Condition status: Improving. She has no history of hypothyroidism or obesity. Pertinent negatives include no chest pain or shortness of breath. Current antihyperlipidemic treatment includes diet change (Omega-3 and diet). The current treatment provides significant improvement of lipids.       Review of Systems   Constitutional:  Negative for chills, fatigue and fever.   HENT:  Negative for ear pain and sore throat.    Eyes:  Negative for pain and redness.   Respiratory:  Negative for cough, shortness of breath and wheezing.    Cardiovascular:  Negative for chest pain, palpitations and leg swelling.   Gastrointestinal:  Negative for abdominal pain, blood in stool, constipation, diarrhea, nausea and vomiting.   Genitourinary:  Negative for dysuria, frequency, menstrual problem, pelvic pain and vaginal discharge.   Skin:  Negative for color change and pallor.   Neurological:  Negative for dizziness and headaches.   Psychiatric/Behavioral:  Negative for confusion. The patient is not nervous/anxious.             Current Outpatient Medications   Medication Sig Dispense Refill    Vitamin D3 2000 units Oral Cap Take 1 capsule (2,000 Units total) by mouth daily. 90 capsule 3    Omega-3 Fatty Acids (OMEGA-3 FISH OIL) 1000 MG Oral Cap Take 1 tablet by mouth.      Multiple Vitamin (MULTI-VITAMINS) Oral Tab Take by mouth  daily.         Allergies:  Allergies   Allergen Reactions    Promethazine      Other reaction(s): problems breathing, Phenergan      PHYSICAL EXAM:   Physical Exam  Vitals and nursing note reviewed.   Constitutional:       General: She is not in acute distress.     Appearance: She is well-developed.   HENT:      Head: Normocephalic and atraumatic.      Right Ear: Tympanic membrane, ear canal and external ear normal.      Left Ear: Tympanic membrane, ear canal and external ear normal.      Nose:      Right Sinus: No maxillary sinus tenderness or frontal sinus tenderness.      Left Sinus: No maxillary sinus tenderness or frontal sinus tenderness.   Eyes:      General: No scleral icterus.        Right eye: No discharge.         Left eye: No discharge.   Neck:      Thyroid: Thyromegaly (?) present.      Vascular: No JVD.   Cardiovascular:      Rate and Rhythm: Normal rate and regular rhythm.      Heart sounds: Normal heart sounds. No murmur heard.  Pulmonary:      Effort: Pulmonary effort is normal. No respiratory distress.      Breath sounds: Normal breath sounds. No wheezing or rales.   Chest:      Chest wall: No mass.   Breasts:     Breasts are symmetrical.      Right: No inverted nipple, mass, nipple discharge, skin change or tenderness.      Left: No inverted nipple, mass, nipple discharge, skin change or tenderness.   Abdominal:      Palpations: Abdomen is soft. There is no mass.      Tenderness: There is no abdominal tenderness.   Genitourinary:     General: Normal vulva.      Labia:         Right: No tenderness or lesion.         Left: No tenderness or lesion.       Vagina: Normal. No vaginal discharge, erythema or tenderness.      Cervix: No cervical motion tenderness, discharge or friability.      Uterus: Not enlarged and not tender.       Adnexa:         Right: No mass or tenderness.          Left: No mass or tenderness.        Comments: Anus no external hemorrhoids or mass   Musculoskeletal:      Cervical  back: Neck supple.   Lymphadenopathy:      Cervical: No cervical adenopathy.      Upper Body:      Right upper body: No supraclavicular or pectoral adenopathy.      Left upper body: No supraclavicular or pectoral adenopathy.   Skin:     General: Skin is warm and dry.   Neurological:      Mental Status: She is alert and oriented to person, place, and time.   Psychiatric:         Mood and Affect: Mood is not anxious or depressed.         Behavior: Behavior normal.         Blood pressure 117/78, pulse 78, weight 137 lb (62.1 kg), not currently breastfeeding.           ASSESSMENT/PLAN:   Pe (physical exam), routine  (primary encounter   diagnosis)  Maintain a healthy diet , low saturated fat and low sugar diet  Keep good hydration  Maintain a regular activity /walking as tolerated   Complete labs as ordered,    Pap smear today      mammogram -to be completed patient was getting orders Dr. Martino   Mammogram dg - ordered left breast adenoma   Immunizations -discussed   patient needs  today - flu shot , recommend - tdap ,  shingrix , covid 19  Patient verbalized understanding and compliance     Need for influenza vaccination  Today    Vitamin d deficiency  Vitamin D3 2000 units  qd        hYPERLIPIDEMIA  Keep low saturated fat  diet   Avoid patient is vegan-eats vegetables  Mostly avoids fast/fried food  fruits and vegetables   Keep active /walking ,exercise as  tolerated  Continue present management low saturated fat diet exercise regulary  LDL improving continue present management we'll monitor  Labs to complete            Need for influenza vaccination  - Fluzone trivalent vaccine, PF 0.5mL, 6mo+ (91649)     Goiter  - US THYROID (CPT=76536); Future     Screen for colon cancer  Pt refused colonosopy   Asymotmatic no fhx colon ca  - COLOGUARD COLON CANCER SCREENING (EXTERNAL)    Hx  Breast fibroadenoma in female, left  - NENITA DIAGNOSTIC BILATERAL (CPT=77066); Future    Encounter for gynecological examination  Pap smear   pelvic exam ,breast exam today     - ThinPrep PAP Smear [E]; Future  - Hpv High Risk , Thin Prep Collect; Future  - ThinPrep PAP Smear [E]  - Hpv High Risk , Thin Prep Collect  - THINPREP PAP SMEAR ONLY       Orders Placed This Encounter   Procedures    Hpv High Risk , Thin Prep Collect    Fluzone trivalent vaccine, PF 0.5mL, 6mo+ (56386)    ThinPrep PAP Smear [E]    THINPREP PAP SMEAR ONLY       Meds This Visit:  Requested Prescriptions      No prescriptions requested or ordered in this encounter       Imaging & Referrals:  INFLUENZA VACCINE, TRI, PRESERV FREE, 0.5 ML  COLOGUARD COLON CANCER SCREENING (EXTERNAL)  US THYROID (CPT=76536)  NENITA DIAGNOSTIC BILATERAL (CPT=77066)       ID#[id number]

## 2024-11-22 LAB — HPV E6+E7 MRNA CVX QL NAA+PROBE: NEGATIVE

## 2024-11-25 NOTE — PROGRESS NOTES
Edwin Chow is a 38year old female. HPI:     HPI     EP/ pt was last seen on 2/13/20 with [facility]. Pt has been using Pred Forte qid OS and oral Valtrex tid PO.  Pt called stating that she was concerned about one of the side effects on the eye drop and ca Pt calling with c/o sore throat, ear ache. Has had strep previously and this feels like it did with that. Scheduled to see dr. Liu on 12/4 but not able to get in prior to that. ATBs ordered to pharmacy.   Allergies:    Promethazine                Comment:Other reaction(s): problems breathing, Phenergan    ROS:       PHYSICAL EXAM:     Base Eye Exam     Visual Acuity (Snellen - Linear)       Right Left    Dist sc 20/20 20/20          Tonometry       Ri

## 2024-12-02 PROBLEM — D24.2 BREAST FIBROADENOMA IN FEMALE, LEFT: Status: ACTIVE | Noted: 2024-12-02

## 2025-04-19 ENCOUNTER — LAB ENCOUNTER (OUTPATIENT)
Dept: LAB | Age: 50
End: 2025-04-19
Attending: INTERNAL MEDICINE
Payer: COMMERCIAL

## 2025-04-19 DIAGNOSIS — E53.8 VITAMIN B12 DEFICIENCY: ICD-10-CM

## 2025-04-19 DIAGNOSIS — Z00.00 PE (PHYSICAL EXAM), ROUTINE: ICD-10-CM

## 2025-04-19 DIAGNOSIS — E55.9 VITAMIN D DEFICIENCY: ICD-10-CM

## 2025-04-19 LAB
ALBUMIN SERPL-MCNC: 4.5 G/DL (ref 3.2–4.8)
ALBUMIN/GLOB SERPL: 1.5 {RATIO} (ref 1–2)
ALP LIVER SERPL-CCNC: 61 U/L (ref 41–108)
ALT SERPL-CCNC: 17 U/L (ref 10–49)
ANION GAP SERPL CALC-SCNC: 7 MMOL/L (ref 0–18)
AST SERPL-CCNC: 20 U/L (ref ?–34)
BASOPHILS # BLD AUTO: 0.08 X10(3) UL (ref 0–0.2)
BASOPHILS NFR BLD AUTO: 1.3 %
BILIRUB SERPL-MCNC: 0.6 MG/DL (ref 0.3–1.2)
BILIRUB UR QL: NEGATIVE
BUN BLD-MCNC: 10 MG/DL (ref 9–23)
BUN/CREAT SERPL: 14.7 (ref 10–20)
CALCIUM BLD-MCNC: 9.3 MG/DL (ref 8.7–10.4)
CHLORIDE SERPL-SCNC: 105 MMOL/L (ref 98–112)
CHOLEST SERPL-MCNC: 225 MG/DL (ref ?–200)
CLARITY UR: CLEAR
CO2 SERPL-SCNC: 25 MMOL/L (ref 21–32)
COLOR UR: COLORLESS
CREAT BLD-MCNC: 0.68 MG/DL (ref 0.55–1.02)
DEPRECATED RDW RBC AUTO: 41 FL (ref 35.1–46.3)
EGFRCR SERPLBLD CKD-EPI 2021: 105 ML/MIN/1.73M2 (ref 60–?)
EOSINOPHIL # BLD AUTO: 0.25 X10(3) UL (ref 0–0.7)
EOSINOPHIL NFR BLD AUTO: 3.9 %
ERYTHROCYTE [DISTWIDTH] IN BLOOD BY AUTOMATED COUNT: 12.7 % (ref 11–15)
FASTING PATIENT LIPID ANSWER: YES
FASTING STATUS PATIENT QL REPORTED: YES
GLOBULIN PLAS-MCNC: 3 G/DL (ref 2–3.5)
GLUCOSE BLD-MCNC: 97 MG/DL (ref 70–99)
GLUCOSE UR-MCNC: NORMAL MG/DL
HCT VFR BLD AUTO: 37.7 % (ref 35–48)
HDLC SERPL-MCNC: 42 MG/DL (ref 40–59)
HGB BLD-MCNC: 12.3 G/DL (ref 12–16)
IMM GRANULOCYTES # BLD AUTO: 0.01 X10(3) UL (ref 0–1)
IMM GRANULOCYTES NFR BLD: 0.2 %
KETONES UR-MCNC: NEGATIVE MG/DL
LDLC SERPL CALC-MCNC: 139 MG/DL (ref ?–100)
LEUKOCYTE ESTERASE UR QL STRIP.AUTO: 25
LYMPHOCYTES # BLD AUTO: 2.34 X10(3) UL (ref 1–4)
LYMPHOCYTES NFR BLD AUTO: 36.6 %
MCH RBC QN AUTO: 28.9 PG (ref 26–34)
MCHC RBC AUTO-ENTMCNC: 32.6 G/DL (ref 31–37)
MCV RBC AUTO: 88.7 FL (ref 80–100)
MONOCYTES # BLD AUTO: 0.41 X10(3) UL (ref 0.1–1)
MONOCYTES NFR BLD AUTO: 6.4 %
NEUTROPHILS # BLD AUTO: 3.3 X10 (3) UL (ref 1.5–7.7)
NEUTROPHILS # BLD AUTO: 3.3 X10(3) UL (ref 1.5–7.7)
NEUTROPHILS NFR BLD AUTO: 51.6 %
NITRITE UR QL STRIP.AUTO: NEGATIVE
NONHDLC SERPL-MCNC: 183 MG/DL (ref ?–130)
OSMOLALITY SERPL CALC.SUM OF ELEC: 283 MOSM/KG (ref 275–295)
PH UR: 5.5 [PH] (ref 5–8)
PLATELET # BLD AUTO: 269 10(3)UL (ref 150–450)
POTASSIUM SERPL-SCNC: 4.2 MMOL/L (ref 3.5–5.1)
PROT SERPL-MCNC: 7.5 G/DL (ref 5.7–8.2)
PROT UR-MCNC: NEGATIVE MG/DL
RBC # BLD AUTO: 4.25 X10(6)UL (ref 3.8–5.3)
SODIUM SERPL-SCNC: 137 MMOL/L (ref 136–145)
SP GR UR STRIP: <1.005 (ref 1–1.03)
TRIGL SERPL-MCNC: 242 MG/DL (ref 30–149)
TSI SER-ACNC: 0.98 UIU/ML (ref 0.55–4.78)
UROBILINOGEN UR STRIP-ACNC: NORMAL
VIT B12 SERPL-MCNC: 1070 PG/ML (ref 211–911)
VIT D+METAB SERPL-MCNC: 46.4 NG/ML (ref 30–100)
VLDLC SERPL CALC-MCNC: 45 MG/DL (ref 0–30)
WBC # BLD AUTO: 6.4 X10(3) UL (ref 4–11)

## 2025-04-19 PROCEDURE — 81001 URINALYSIS AUTO W/SCOPE: CPT

## 2025-04-19 PROCEDURE — 87086 URINE CULTURE/COLONY COUNT: CPT

## 2025-04-19 PROCEDURE — 36415 COLL VENOUS BLD VENIPUNCTURE: CPT

## 2025-04-19 PROCEDURE — 80061 LIPID PANEL: CPT

## 2025-04-19 PROCEDURE — 82306 VITAMIN D 25 HYDROXY: CPT

## 2025-04-19 PROCEDURE — 80053 COMPREHEN METABOLIC PANEL: CPT

## 2025-04-19 PROCEDURE — 85025 COMPLETE CBC W/AUTO DIFF WBC: CPT

## 2025-04-19 PROCEDURE — 82607 VITAMIN B-12: CPT

## 2025-04-19 PROCEDURE — 84443 ASSAY THYROID STIM HORMONE: CPT

## 2025-04-24 ENCOUNTER — PATIENT MESSAGE (OUTPATIENT)
Dept: INTERNAL MEDICINE CLINIC | Facility: CLINIC | Age: 50
End: 2025-04-24

## 2025-04-24 DIAGNOSIS — R31.29 MICROSCOPIC HEMATURIA: Primary | ICD-10-CM

## 2025-07-19 ENCOUNTER — HOSPITAL ENCOUNTER (OUTPATIENT)
Dept: ULTRASOUND IMAGING | Age: 50
Discharge: HOME | End: 2025-07-19
Attending: INTERNAL MEDICINE
Payer: COMMERCIAL

## 2025-07-19 DIAGNOSIS — E04.9 GOITER: ICD-10-CM

## 2025-07-19 PROCEDURE — 76536 US EXAM OF HEAD AND NECK: CPT | Performed by: INTERNAL MEDICINE

## (undated) NOTE — LETTER
[facility], [address] [address] [phone]      Demographics      Denisa Garcia   [id number]   Female   3/30/1975 (54year old)   Allergies  Review status set to Review Complete on 9/8/202

## (undated) NOTE — LETTER
06/12/19        Tommy Cerna  [address] [facility] [id number]      Dear Ariana Tobar,    Our records indicate that you have outstanding lab work and or testing that was ordered for you and has not yet been completed:  Orders Placed This Encounter      Comp

## (undated) NOTE — LETTER
February 13, 2020    Brionna Andrade PA-C  [address]     Patient: Shawanda López   YOB: 1975   Date of Visit: 2/13/2020       Dear Dr. Rui Medel PA-C:    Thank you for referring Shawanda López to me for evaluation.  Here is (three) times daily for 7 days. 21 tablet 0   • Vitamin D3 2000 units Oral Cap Take 1 capsule (2,000 Units total) by mouth daily. 90 capsule 3   • Omega-3 Fatty Acids (OMEGA-3 FISH OIL) 1000 MG Oral Cap Take 1 tablet by mouth.      • Vitamin D3 2000 UNITS O s.Start Valtrex (1 gram) 3 times a day for 7 days, then discontinue. Patient should call office and make a sooner return appointment if symptoms worsen. No orders of the defined types were placed in this encounter.       Meds This Visit:  Reque

## (undated) NOTE — LETTER
2/20/2020          To Whom It May Concern:    Alan Barakat is currently under my medical care and may not return to work at this time. Please excuse Jennifer Copeland from February 13 through February 27th.     If you require additional information please contact our